# Patient Record
Sex: MALE | URBAN - METROPOLITAN AREA
[De-identification: names, ages, dates, MRNs, and addresses within clinical notes are randomized per-mention and may not be internally consistent; named-entity substitution may affect disease eponyms.]

---

## 2022-01-10 ENCOUNTER — OUTSIDE PLACE OF SERVICE (OUTPATIENT)
Dept: GASTROENTEROLOGY | Facility: CLINIC | Age: 67
End: 2022-01-10
Payer: COMMERCIAL

## 2022-01-10 PROCEDURE — 45385 PR COLONOSCOPY,REMV LESN,SNARE: ICD-10-PCS | Mod: 33,,, | Performed by: INTERNAL MEDICINE

## 2022-01-10 PROCEDURE — 45385 COLONOSCOPY W/LESION REMOVAL: CPT | Mod: 33,,, | Performed by: INTERNAL MEDICINE

## 2022-02-17 ENCOUNTER — TELEPHONE (OUTPATIENT)
Dept: GASTROENTEROLOGY | Facility: CLINIC | Age: 67
End: 2022-02-17
Payer: COMMERCIAL

## 2022-02-17 NOTE — TELEPHONE ENCOUNTER
----- Message from Cindy Chambers sent at 2/17/2022  1:54 PM CST -----  Regarding: Returning call  Contact: Pt  Type:  Patient Returning Call    Who Called: Son Chavira   Who Left Message for Patient: unk  Does the patient know what this is regarding?: test results   Would the patient rather a call back or a response via [facility]? Call back   Best Call Back Number: [phone]  Additional Information:

## 2022-02-17 NOTE — TELEPHONE ENCOUNTER
----- Message from Marty Cisneros MD sent at 2/16/2022 10:14 AM CST -----  Barium ENEMA showed diverticuli but no polyps-repeat COLON in 5 yrs as he had a colon polyp on my survey

## 2022-02-28 ENCOUNTER — TELEPHONE (OUTPATIENT)
Dept: GASTROENTEROLOGY | Facility: CLINIC | Age: 67
End: 2022-02-28
Payer: COMMERCIAL

## 2022-02-28 NOTE — TELEPHONE ENCOUNTER
----- Message from Tabby Molina sent at 2/28/2022  2:38 PM CST -----  Contact: self  Type:  Patient Returning Call    Who Called: Son Chavira   Who Left Message for Patient: Caller unsure  Does the patient know what this is regarding?: Caller unsure  Would the patient rather a call back or a response via [facility]?  Call back   Best Call Back Number: [phone]   Additional Information: n/a